# Patient Record
Sex: MALE | Race: BLACK OR AFRICAN AMERICAN
[De-identification: names, ages, dates, MRNs, and addresses within clinical notes are randomized per-mention and may not be internally consistent; named-entity substitution may affect disease eponyms.]

---

## 2023-04-13 ENCOUNTER — APPOINTMENT (OUTPATIENT)
Dept: ORTHOPEDIC SURGERY | Facility: CLINIC | Age: 63
End: 2023-04-13
Payer: MEDICARE

## 2023-04-13 VITALS — HEIGHT: 73 IN | WEIGHT: 228 LBS | BODY MASS INDEX: 30.22 KG/M2

## 2023-04-13 DIAGNOSIS — Z85.46 PERSONAL HISTORY OF MALIGNANT NEOPLASM OF PROSTATE: ICD-10-CM

## 2023-04-13 DIAGNOSIS — Z78.9 OTHER SPECIFIED HEALTH STATUS: ICD-10-CM

## 2023-04-13 DIAGNOSIS — Z00.00 ENCOUNTER FOR GENERAL ADULT MEDICAL EXAMINATION W/OUT ABNORMAL FINDINGS: ICD-10-CM

## 2023-04-13 DIAGNOSIS — M17.11 UNILATERAL PRIMARY OSTEOARTHRITIS, RIGHT KNEE: ICD-10-CM

## 2023-04-13 DIAGNOSIS — M70.61 TROCHANTERIC BURSITIS, RIGHT HIP: ICD-10-CM

## 2023-04-13 DIAGNOSIS — M25.462 EFFUSION, LEFT KNEE: ICD-10-CM

## 2023-04-13 PROCEDURE — 73564 X-RAY EXAM KNEE 4 OR MORE: CPT | Mod: 50

## 2023-04-13 PROCEDURE — 99204 OFFICE O/P NEW MOD 45 MIN: CPT | Mod: 25

## 2023-04-13 PROCEDURE — 73502 X-RAY EXAM HIP UNI 2-3 VIEWS: CPT

## 2023-04-13 PROCEDURE — 20610 DRAIN/INJ JOINT/BURSA W/O US: CPT | Mod: 50

## 2023-04-13 NOTE — HISTORY OF PRESENT ILLNESS
[7] : 7 [6] : 6 [Dull/Aching] : dull/aching [Constant] : constant [Leisure] : leisure [Sleep] : sleep [Nothing helps with pain getting better] : Nothing helps with pain getting better [Sitting] : sitting [Standing] : standing [Exercising] : exercising [Stairs] : stairs [de-identified] : 62yo m with bilateral knee pain L>R ongoing for a few months. Has had csi and orthovisc with  in the past. CSI helped, visco did not. Also c/o right lateral hip pain ongoing for months, can not lay on his right side. [] : no [FreeTextEntry1] : B/L Knees, R Hip  [FreeTextEntry5] : Pt complaining of B/L knee pain and R Hip pain. Pt limping as he walk not able to walk upstairs started a couple months ago. Pt denies any injuries. ROM limited. Pt feels slight numbness in the right hip. [de-identified] : x [de-identified] : none

## 2023-04-13 NOTE — IMAGING
[Right] : right hip with pelvis [There are no fractures, subluxations or dislocations. No significant abnormalities are seen] : There are no fractures, subluxations or dislocations. No significant abnormalities are seen [Bilateral] : knee bilaterally [All Views] : anteroposterior, lateral, skyline, and anteroposterior standing [advanced tricompartmental OA with medial compartment narrowing and varus alignment] : advanced tricompartmental OA with medial compartment narrowing and varus alignment

## 2023-04-13 NOTE — PROCEDURE
[Large Joint Injection] : Large joint injection [Right] : of the right [Greater Trochanteric Bursa] : greater trochanteric bursa [Pain] : pain [Inflammation] : inflammation [Alcohol] : alcohol [Betadine] : betadine [Ethyl Chloride sprayed topically] : ethyl chloride sprayed topically [Sterile technique used] : sterile technique used [___ cc    1%] : Lidocaine ~Vcc of 1%  [___ cc    40mg] : Triamcinolone (Kenalog) ~Vcc of 40 mg  [____] : [unfilled] [] : Patient tolerated procedure well [Previous OTC use and PT nontherapeutic] : patient has tried OTC's including aspirin, Ibuprofen, Aleve, etc or prescription NSAIDS, and/or exercises at home and/or physical therapy without satisfactory response [Patient had decreased mobility in the joint] : patient had decreased mobility in the joint [Risks, benefits, alternatives discussed / Verbal consent obtained] : the risks benefits, and alternatives have been discussed, and verbal consent was obtained [Other: ____] : [unfilled] [Left] : of the left [Knee] : knee [Effusion] : effusion [de-identified] : 29cc [de-identified] : clear/straw [FreeTextEntry3] : Large joint injection was performed of the left knee. An injection of Lidocaine 3cc of 1% , Ropivacaine 4cc of 0.5% , Triamcinolone (Kenalog) 2cc of 40 mg  was used. \par Patient was advised to call if redness, pain or fever occur and apply ice for 15 minutes out of every hour for the next 12-24 hours as tolerated. \par \par Patient has tried OTC's including aspirin, Ibuprofen, Aleve, etc or prescription NSAIDS, and/or exercises at home and/or physical therapy without satisfactory response, patient had decreased mobility in the joint and the risks benefits, and alternatives have been discussed, and verbal consent was obtained. \par The site was prepped with alcohol, betadine and ethyl chloride sprayed topically\par \par The risks, benefits and contents of the injection have been discussed.  Risks include but are not limited to allergic reaction, flare reaction, permanent white skin discoloration at the injection site and infection.  The patient understands the risks and agrees to having the injection.  All questions have been answered.\par \par Ultrasound guidance was indicated for this patient due to prior failure or difficult injection. All ultrasound images have been permanently captured and stored accordingly in our picture archiving and communication system.

## 2023-04-13 NOTE — PHYSICAL EXAM
[Left] : left knee [Right] : right hip [] : no extensor lag [TWNoteComboBox7] : flexion 125 degrees [de-identified] : extension 0 degrees

## 2023-04-13 NOTE — DISCUSSION/SUMMARY
[de-identified] : 64yo m with b/l knee oa and r hip troch bursitis\par 1) asp and csi left knee today tolerated well - 29cc\par 2) csi right hip bursitis today tolerated well\par 3) cryotherapy \par 4) NSAIDs as needed\par 5) rtc in 1-2 weeks if right knee persists for csi, otherwise prn

## 2023-04-28 ENCOUNTER — APPOINTMENT (OUTPATIENT)
Dept: ORTHOPEDIC SURGERY | Facility: CLINIC | Age: 63
End: 2023-04-28
Payer: MEDICARE

## 2023-04-28 VITALS — WEIGHT: 228 LBS | BODY MASS INDEX: 30.22 KG/M2 | HEIGHT: 73 IN

## 2023-04-28 PROCEDURE — 99213 OFFICE O/P EST LOW 20 MIN: CPT | Mod: 25

## 2023-04-28 PROCEDURE — 20610 DRAIN/INJ JOINT/BURSA W/O US: CPT | Mod: LT

## 2023-04-28 NOTE — HISTORY OF PRESENT ILLNESS
[8] : 8 [4] : 4 [Dull/Aching] : dull/aching [Sharp] : sharp [Constant] : constant [Leisure] : leisure [Sleep] : sleep [Nothing helps with pain getting better] : Nothing helps with pain getting better [Sitting] : sitting [Standing] : standing [Exercising] : exercising [Stairs] : stairs [Retired] : Work status: retired [de-identified] : 4/28/23: Here to f/up with left knee. CSI/aspiration at last visit. Has been experiencing a return of swelling over the left knee. \par 64yo m with bilateral knee pain L>R ongoing for a few months. Has had csi and orthovisc with  in the past. CSI helped, visco did not. Also c/o right lateral hip pain ongoing for months, can not lay on his right side. [] : no [FreeTextEntry1] : B/L Knees, R Hip  [FreeTextEntry5] : Pt complaining of B/L knee pain and R Hip pain. Pt limping as he walk not able to walk upstairs started a couple months ago. Pt denies any injuries. ROM limited. Pt feels slight numbness in the right hip. [de-identified] : none

## 2023-04-28 NOTE — PHYSICAL EXAM
[Left] : left knee [Right] : right knee [] : no extensor lag [TWNoteComboBox7] : flexion 125 degrees [de-identified] : extension 0 degrees

## 2023-04-28 NOTE — DISCUSSION/SUMMARY
[de-identified] : 63m with b/l knee djd. Recurrent left knee effusion. discussed availability of visco injections and pt would like to proceed. \par Euflexxa #1 left knee. Injection tolerated well. Post injection instructions reviewed.\par 1) wbat, cryotherapy\par 2) rtc 1 week \par \par Aspiration left knee - 50cc \par \par Entered by Margaret Nolan acting as scribe.\par Dr. Rollins-\par The documentation recorded by the scribe accurately reflects the service I personally performed and the decisions made by me.

## 2023-04-28 NOTE — PROCEDURE
[Large Joint Injection] : Large joint injection [Greater Trochanteric Bursa] : greater trochanteric bursa [Pain] : pain [Inflammation] : inflammation [Alcohol] : alcohol [Betadine] : betadine [Ethyl Chloride sprayed topically] : ethyl chloride sprayed topically [Sterile technique used] : sterile technique used [___ cc    1%] : Lidocaine ~Vcc of 1%  [___ cc    40mg] : Triamcinolone (Kenalog) ~Vcc of 40 mg  [____] : [unfilled] [] : Patient tolerated procedure well [Previous OTC use and PT nontherapeutic] : patient has tried OTC's including aspirin, Ibuprofen, Aleve, etc or prescription NSAIDS, and/or exercises at home and/or physical therapy without satisfactory response [Patient had decreased mobility in the joint] : patient had decreased mobility in the joint [Risks, benefits, alternatives discussed / Verbal consent obtained] : the risks benefits, and alternatives have been discussed, and verbal consent was obtained [Left] : of the left [FreeTextEntry3] : Large joint injection was performed  of the left knee. The indication for this procedure was x-ray evidence of Osteoarthritis on this or prior visit. The site was prepped with alcohol and betadine. An injection of Lidocaine 3cc of 1% , Euflexxa 2ml, # [1]  was used. \par \par Patient was advised to call if redness, pain or fever occur and apply ice for 15 minutes out of every hour for the next 12-24 hours as tolerated. \par \par Patient has tried OTC's including aspirin, Ibuprofen, Aleve, etc or prescription NSAIDS, and/or exercises at home and/or physical therapy without satisfactory response, patient had decreased mobility in the joint and the risks benefits, and alternatives have been discussed, and verbal consent was obtained. \par \par The risks, benefits and contents of the injection have been discussed.  Risks include but are not limited to allergic reaction, flare reaction, permanent white skin discoloration at the injection site and infection.  The patient understands the risks and agrees to having the injection.  All questions have been answered.\par \par  [Other: ____] : [unfilled] [Right] : of the right [Knee] : knee [Effusion] : effusion [Prior failure or difficult injection] : prior failure or difficult injection [All ultrasound images have been permanently captured and stored accordingly in our picture archiving and communication system] : All ultrasound images have been permanently captured and stored accordingly in our picture archiving and communication system [de-identified] : 50cc [de-identified] : clear/straw

## 2023-05-04 ENCOUNTER — APPOINTMENT (OUTPATIENT)
Dept: ORTHOPEDIC SURGERY | Facility: CLINIC | Age: 63
End: 2023-05-04
Payer: MEDICARE

## 2023-05-04 VITALS — HEIGHT: 73 IN | WEIGHT: 228 LBS | BODY MASS INDEX: 30.22 KG/M2

## 2023-05-04 PROCEDURE — 20611 DRAIN/INJ JOINT/BURSA W/US: CPT | Mod: LT

## 2023-05-04 PROCEDURE — 99024 POSTOP FOLLOW-UP VISIT: CPT

## 2023-05-04 NOTE — PROCEDURE
[Other: ____] : [unfilled] [Right] : of the right [Knee] : knee [Effusion] : effusion [Prior failure or difficult injection] : prior failure or difficult injection [All ultrasound images have been permanently captured and stored accordingly in our picture archiving and communication system] : All ultrasound images have been permanently captured and stored accordingly in our picture archiving and communication system [de-identified] : 50cc [de-identified] : clear/straw [FreeTextEntry3] : Large joint injection was performed  of the left knee. The indication for this procedure was x-ray evidence of Osteoarthritis on this or prior visit. The site was prepped with alcohol and betadine. An injection of Lidocaine 3cc of 1% , Euflexxa 2ml, # [2]  was used. \par \par Patient was advised to call if redness, pain or fever occur and apply ice for 15 minutes out of every hour for the next 12-24 hours as tolerated. \par \par Patient has tried OTC's including aspirin, Ibuprofen, Aleve, etc or prescription NSAIDS, and/or exercises at home and/or physical therapy without satisfactory response, patient had decreased mobility in the joint and the risks benefits, and alternatives have been discussed, and verbal consent was obtained. \par \par The risks, benefits and contents of the injection have been discussed.  Risks include but are not limited to allergic reaction, flare reaction, permanent white skin discoloration at the injection site and infection.  The patient understands the risks and agrees to having the injection.  All questions have been answered.\par \par Ultrasound guidance was indicated for this patient due to prior failure or difficult injection.\par \par \par

## 2023-05-04 NOTE — PHYSICAL EXAM
[Left] : left knee [Right] : right knee [] : no extensor lag [TWNoteComboBox7] : flexion 125 degrees [de-identified] : extension 0 degrees

## 2023-05-04 NOTE — HISTORY OF PRESENT ILLNESS
[6] : 6 [4] : 4 [Dull/Aching] : dull/aching [Sharp] : sharp [Constant] : constant [Leisure] : leisure [Sleep] : sleep [Nothing helps with pain getting better] : Nothing helps with pain getting better [Sitting] : sitting [Standing] : standing [Exercising] : exercising [Stairs] : stairs [Retired] : Work status: retired [de-identified] : 5/4/23: here for 2nd euflexxa injection\par 4/28/23: Here to f/up with left knee. CSI/aspiration at last visit. Has been experiencing a return of swelling over the left knee. \par 64yo m with bilateral knee pain L>R ongoing for a few months. Has had csi and orthovisc with  in the past. CSI helped, visco did not. Also c/o right lateral hip pain ongoing for months, can not lay on his right side. [] : no [FreeTextEntry1] : Lt Knee  [FreeTextEntry5] : 05/04/23: Pt feeling improvement in the Lt Knee. Euflexxa #2. \par Pt complaining of B/L knee pain and R Hip pain. Pt limping as he walk not able to walk upstairs started a couple months ago. Pt denies any injuries. ROM limited. Pt feels slight numbness in the right hip. [de-identified] : none

## 2023-05-04 NOTE — DISCUSSION/SUMMARY
[de-identified] : 63m with b/l knee djd. Recurrent left knee effusion. discussed availability of visco injections and pt would like to proceed. \par Euflexxa #2 left knee. Injection tolerated well. Post injection instructions reviewed.\par 1) wbat, cryotherapy\par 2) rtc 1 week \par \par Aspiration left knee - 50cc \par \par

## 2023-05-11 ENCOUNTER — APPOINTMENT (OUTPATIENT)
Dept: ORTHOPEDIC SURGERY | Facility: CLINIC | Age: 63
End: 2023-05-11
Payer: MEDICARE

## 2023-05-11 VITALS — HEIGHT: 73 IN | BODY MASS INDEX: 30.22 KG/M2 | WEIGHT: 228 LBS

## 2023-05-11 DIAGNOSIS — M17.12 UNILATERAL PRIMARY OSTEOARTHRITIS, LEFT KNEE: ICD-10-CM

## 2023-05-11 PROCEDURE — 20611 DRAIN/INJ JOINT/BURSA W/US: CPT | Mod: LT

## 2023-05-11 NOTE — PROCEDURE
[Other: ____] : [unfilled] [Right] : of the right [Knee] : knee [Effusion] : effusion [Prior failure or difficult injection] : prior failure or difficult injection [All ultrasound images have been permanently captured and stored accordingly in our picture archiving and communication system] : All ultrasound images have been permanently captured and stored accordingly in our picture archiving and communication system [de-identified] : 50cc [de-identified] : clear/straw [FreeTextEntry3] : Large joint injection was performed  of the left knee. The indication for this procedure was x-ray evidence of Osteoarthritis on this or prior visit. The site was prepped with alcohol and betadine. An injection of Lidocaine 3cc of 1% , Euflexxa 2ml, # [3]  was used. \par \par Patient was advised to call if redness, pain or fever occur and apply ice for 15 minutes out of every hour for the next 12-24 hours as tolerated. \par \par Patient has tried OTC's including aspirin, Ibuprofen, Aleve, etc or prescription NSAIDS, and/or exercises at home and/or physical therapy without satisfactory response, patient had decreased mobility in the joint and the risks benefits, and alternatives have been discussed, and verbal consent was obtained. \par \par The risks, benefits and contents of the injection have been discussed.  Risks include but are not limited to allergic reaction, flare reaction, permanent white skin discoloration at the injection site and infection.  The patient understands the risks and agrees to having the injection.  All questions have been answered.\par \par Ultrasound guidance was indicated for this patient due to prior failure or difficult injection.\par \par \par

## 2023-05-11 NOTE — DISCUSSION/SUMMARY
[de-identified] : 63m with b/l knee djd. Recurrent left knee effusion. discussed availability of visco injections and pt would like to proceed. \par Euflexxa #3 left knee. Injection tolerated well. Post injection instructions reviewed.\par 1) wbat, cryotherapy\par 2) rtc 6-8 weeks\par \par Entered by Margaret Nolan acting as scribe.\par Dr. Rollins-\par The documentation recorded by the scribe accurately reflects the service I personally performed and the decisions made by me. \par \par \par

## 2023-05-11 NOTE — PHYSICAL EXAM
[Left] : left knee [Right] : right knee [] : no extensor lag [TWNoteComboBox7] : flexion 125 degrees [de-identified] : extension 0 degrees

## 2023-05-11 NOTE — HISTORY OF PRESENT ILLNESS
[5] : 5 [4] : 4 [Dull/Aching] : dull/aching [Sharp] : sharp [Constant] : constant [Leisure] : leisure [Sleep] : sleep [Nothing helps with pain getting better] : Nothing helps with pain getting better [Sitting] : sitting [Standing] : standing [Exercising] : exercising [Stairs] : stairs [Retired] : Work status: retired [3] : 3 [Orthovisc] : Orthovisc [de-identified] : 5/11/23: Here to f/up left knee and Euflexxa #3\par 5/4/23: here for 2nd euflexxa injection\par 4/28/23: Here to f/up with left knee. CSI/aspiration at last visit. Has been experiencing a return of swelling over the left knee. \par 64yo m with bilateral knee pain L>R ongoing for a few months. Has had csi and orthovisc with  in the past. CSI helped, visco did not. Also c/o right lateral hip pain ongoing for months, can not lay on his right side. [] : no [FreeTextEntry1] : Lt Knee  [FreeTextEntry5] : Euflexxa #3. Pt feeling improvement in the Lt Knee. ROM Improving. Walking Improving. Occasional Pain. \par  [de-identified] : Inj.

## 2023-06-22 ENCOUNTER — APPOINTMENT (OUTPATIENT)
Dept: ORTHOPEDIC SURGERY | Facility: CLINIC | Age: 63
End: 2023-06-22

## 2024-06-10 ENCOUNTER — APPOINTMENT (OUTPATIENT)
Dept: ORTHOPEDIC SURGERY | Facility: CLINIC | Age: 64
End: 2024-06-10